# Patient Record
(demographics unavailable — no encounter records)

---

## 2017-02-13 NOTE — UC
Kirti DOBSON Erika, scribed for Melisa Solorzano MD on 17 at 1512 .





 Complaint Female HPI





- HPI Summary


HPI Summary: 


Patient is a 28-year-old female presenting to [facility] with a CC of vaginal 

bleeding and abdominal cramping. Patient reports that in , she had a Mirena 

IUD placed, and she has not had periods since then. She states that she can 

still feel the strings of her IUD in place. Three days ago, patient developed 

heavy vaginal bleeding with clots, as well as gradual onset of suprapubic and 

lower back cramping. She reports that pain is worse than regular menstrual 

cramps. Now, bleeding has become lighter, but is still present. Patient denies 

nausea, vomiting, dysuria, and vaginal discharge. She states she has had yeast 

infections in the past, but this does not feel similar. A0. Patient has a 

monogamous partner and denies prior Hx STI.





- History Of Current Complaint


Chief Complaint: UCGeneralIllness


Stated Complaint: VAG BLEEDING


Time Seen by Provider: 17 14:11


Hx Obtained From: Patient


Hx Last Menstrual Period: IUD in  - no periods


Pregnant?: No


Onset/Duration: Gradual Onset, Lasting Days, Still Present


Timing: Constant


Severity Currently: Moderate


Pain Intensity: 6


Pain Scale Used: 0-10 Numeric


Character: Cramping


Aggravating Factor(s): Nothing


Alleviating Factor(s): Nothing


Associated Signs And Symptoms: Positive: Back Pain - lower back, Vaginal 

Bleeding/Discharge - Bleeding.  Negative: Vaginal Discharge, Nausea


Related Hx:  - 2, Para - 2





- Allergies/Home Medications


Allergies/Adverse Reactions: 


 Allergies











Allergy/AdvReac Type Severity Reaction Status Date / Time


 


Erythromycin Allergy Severe Hives Verified 17 14:00














PMH/Surg Hx/FS Hx/Imm Hx


Endocrine History Of: 


   Denies: Diabetes, Thyroid Disease


Cardiovascular History Of: 


   Denies: Cardiac Disorders, Hypertension


Respiratory History Of: Reports: Asthma - albuterol MDI as needed (yearly maybe 

per pt)


   Denies: COPD


GI/ History Of: 


   Denies: Ulcer





- Surgical History


Surgical History: Yes


Surgery Procedure, Year, and Place: removal of ovary r/t cyst age 13.  c-

section x2





- Family History


Known Family History: Positive: Unknown, Cardiac Disease - pt states father has 

arrythmia, Diabetes


   Negative: Hypertension





- Social History


Lives: With Family


Alcohol Use: None


Substance Use Type: None


Smoking Status (MU): Current Every Day Smoker


Type: Cigarettes


Amount Used/How Often: 1/2 PPD


Have You Smoked in the Last Year: Yes





- Immunization History


Most Recent Influenza Vaccination: 





Review of Systems


Constitutional: Negative


Skin: Negative


Eyes: Negative


ENT: Negative


Respiratory: Negative


Cardiovascular: Negative


Gastrointestinal: Abdominal Pain - cramping


Genitourinary: Other - vaginal bleeding


Motor: Negative


Neurovascular: Negative


Musculoskeletal: Myalgia - lower back


Neurological: Negative


Psychological: Negative


All Other Systems Reviewed And Are Negative: Yes





Physical Exam


Triage Information Reviewed: Yes


Appearance: Well-Appearing, Well-Nourished, Pain Distress - Mild


Vital Signs: 


 Initial Vital Signs











Temp  99.1 F   17 13:56


 


Pulse  81   17 13:56


 


Resp  18   17 13:56


 


BP  129/66   17 13:56


 


Pulse Ox  99   17 13:56











Vital Signs Reviewed: Yes


Eyes: Positive: Conjunctiva Clear


ENT: Positive: Normal ENT inspection


Neck: Positive: Supple


Respiratory: Positive: No respiratory distress


Cardiovascular: Positive: RRR, Pulses Normal, Brisk Capillary Refill


Abdomen Description: Positive: Nontender, No Organomegaly, Soft, Other: -  

exam: Moderate amount of blood and clots in the vagina. Strings from the Mirena 

visible. Cervix long and closed and nontender. Uterus normal size and 

nontender. Adnexae no masses and nontender. Nurse Erendira assisted with pelvic 

exam..  Negative: CVA Tenderness (R), CVA Tenderness (L), Distended, Guarding


Bowel Sounds: Positive: Present


Musculoskeletal: Positive: Strength Intact, ROM Intact


Neurological: Positive: Alert, Muscle Tone Normal


Psychological Exam: Normal


Skin Exam: Normal





Diagnostics





- Laboratory


Diagnostic Studies Completed/Ordered: UA: Negative pregnancy, 1.010, pH 8, 30 mg

/dL protein, all else negative





 Complaint Female Dx





- Course


Course Of Treatment: UHCG neg.  UA ph 8, 30mg/dl protein, otherwise neg





- Differential Dx/Diagnosis


Differential Diagnosis/HQI/PQRI: Cervicitis, Endometriosis, Pelvic Inflammatory 

Disease, Pregnancy, Urinary Tract Infection


Provider Diagnoses: menorrhagia





Discharge





- Discharge Plan


Condition: Stable


Disposition: HOME


Prescriptions: 


Ferrous Sulfate [Iron (Ferrous Sulfate)] 50 mg PO BID #30 tab


Ibuprofen TAB* [Motrin TAB* 800 MG] 800 mg PO Q6H #30 tab


Patient Education Materials:  Dysfunctional Uterine Bleeding (ED)


Referrals: 


Vick Gunn MD [Primary Care Provider] - 





The documentation as recorded by the Kirti beltre Erika accurately reflects 

the service I personally performed and the decisions made by Rashad kruse Barbara J, MD.

## 2017-04-06 NOTE — UC
Complaint Female HPI





- HPI Summary


HPI Summary: 





The patient comes in today for:





1.   Vaginal pain:





Onset: Just today--she woke up with it.  


Palliative/provocative:  Nothing makes her pain better or worse. 


Quality: Burning.


Region: Vaginal area. 


Severity: 8/10


Time: Constant.


Associated symptoms:


   Previous disease: "I've had a lot of yeast infections."  Her last one was 

August of 2016


   Vaginal discharge:  None.


   Fevers: None.


   She had intercourse "a few days ago."








*





- History Of Current Complaint


Chief Complaint: UCGU


Stated Complaint: YEAST INFECTION


Time Seen by Provider: 04/06/17 08:23


Hx Last Menstrual Period: 3/1/17


Pregnant?: No





- Allergies/Home Medications


Allergies/Adverse Reactions: 


 Allergies











Allergy/AdvReac Type Severity Reaction Status Date / Time


 


Erythromycin Allergy Severe Hives Verified 04/06/17 07:42














PMH/Surg Hx/FS Hx/Imm Hx


Previously Healthy: No - Family planning/Mirena


Endocrine History Of: 


   Denies: Diabetes, Thyroid Disease


Cardiovascular History Of: 


   Denies: Cardiac Disorders, Hypertension, Pacemaker/ICD, Myocardial Infarction

, Congestive Heart Failure, Atrial Fibrillation, Deep Vein Thrombosis, Bleeding 

Disorders


Respiratory History Of: Reports: Asthma - She does not take any medications at 

this time for her asthma.


   Denies: COPD, Bronchitis, Pneumonia, Pulmonary Embolism


GI/ History Of: 


   Denies: Gastroesophageal Reflux, Ulcer, Gastrointestinal Bleed, Gall Bladder 

Disease, Kidney Stones, Diverticulitis, Renal Disease, Urosepsis


Neurological History Of: 


   Denies: TIA, CVA, Dementia, Seizures, Migraine


Psychological History Of: 


   Denies: Anxiety, Depression, Bipolar Disorder, Schizophrenia, Post Traumatic 

Stress Disorder


Cancer History Of: 


   Denies: Lung Cancer, Colorectal Cancer, Breast Cancer, Prostate Cancer, 

Cervical Cancer


Other History Of: 


   Negative For: HIV, Hepatitis B, Hepatitis C, Anticoagulant Therapy





- Surgical History


Surgical History: Yes


Surgery Procedure, Year, and Place: removal of ovary r/t cyst age 13.  c-

section x2





- Family History


Known Family History: Positive: Cardiac Disease - pt states father has arrythmia

, Diabetes


   Negative: Hypertension





- Social History


Occupation: Employed Full-time


Alcohol Use: None


Substance Use Type: None


Smoking Status (MU): Current Every Day Smoker


Type: Cigarettes


Amount Used/How Often: 1/2 PPD


Have You Smoked in the Last Year: Yes





- Immunization History


Most Recent Influenza Vaccination: 2015/2016





Review of Systems


Constitutional: Negative


Skin: Negative


Eyes: Negative


ENT: Negative


Respiratory: Negative


Cardiovascular: Negative


Gastrointestinal: Negative


Genitourinary: Dysuria, Frequency


All Other Systems Reviewed And Are Negative: Yes





Physical Exam


Triage Information Reviewed: Yes


Appearance: Well-Appearing, No Pain Distress, Well-Nourished


Vital Signs: 


 Initial Vital Signs











Temp  97.5 F   04/06/17 07:44


 


Pulse  56   04/06/17 07:44


 


Resp  14   04/06/17 07:44


 


BP  120/77   04/06/17 07:44


 


Pulse Ox  100   04/06/17 07:44











Vital Signs Reviewed: Yes


Eyes: Positive: Conjunctiva Clear.  Negative: Discharge


ENT: Positive: Hearing grossly normal.  Negative: Pharyngeal erythema, Nasal 

congestion, Nasal drainage, TM bulging, TM dull, TM red, Tonsillar swelling, 

Tonsillar exudate


Dental: Negative: Gross Decay/Caries @, Dental Fracture @


Neck: Positive: Supple, Nontender, No Lymphadenopathy.  Negative: Nuchal 

Rigidity


Respiratory: Positive: Chest non-tender, Lungs clear, No respiratory distress, 

No accessory muscle use.  Negative: Crackles, Wheezing


Cardiovascular: Positive: RRR, No Murmur


Abdomen Description: Positive: Nontender, No Organomegaly, Soft.  Negative: 

Distended, Guarding


Musculoskeletal: Positive: Strength Intact, ROM Intact


Neurological: Positive: Alert, Muscle Tone Normal


Psychological: Negative: Age Appropriate Behavior, Consolable


Skin: Negative: rashes, breakdown





UC Physical Exam


Vital Signs On Initial Exam: 


 Initial Vitals











Temp Pulse Resp BP Pulse Ox


 


 97.5 F   56   14   120/77   100 


 


 04/06/17 07:44  04/06/17 07:44  04/06/17 07:44  04/06/17 07:44  04/06/17 07:44














- Genitalia Exam


Female Genitourinary: Other - External:  No red papules.  No cluster of shallow 

ulcers on an erythematous base.  No edema.  The area that was most tender 

appeared slightly erythematous, (as expected from aggressive intercourse) at 

the vaginal opening.  But, no edema or ulcers.   Speculum:  There was a scant 

amount of whitish fluid.  There was no caking adhearant white material.  No 

foaming of the fluid.  There was no particular ordor.  Cervix: not friable or 

red.   Bimanual:  No cervical motion tenderness.  No masses or tenderness of 

the adnexa.





Diagnostics





- Laboratory


Diagnostic Studies Completed/Ordered: GC/Chlam.  Affirm.  HSV PCR





 Complaint Female Dx





- Course


Course Of Treatment: Patient was told of the findings--no classic appearance of 

HSV or yeast.  Patient was told of her treatment options.





- Differential Dx/Diagnosis


Differential Diagnosis/HQI/PQRI: Cervicitis


Provider Diagnoses: Vaginitis





Discharge





- Discharge Plan


Condition: Stable


Disposition: HOME


Patient Education Materials:  Vaginitis (ED)


Forms:  *Work Release


Referrals: 


Vick Gunn MD [Primary Care Provider] - 1 Week


(Please see your primary care provider in a week to see how well you are doing. 


If you get worse, please be seen sooner in the ER or through us.)

## 2017-04-10 NOTE — UC
Respiratory Complaint HPI





- HPI Summary


HPI Summary: 





Cough, upper chest pain with breathing/cough, and difficulty breathing since 

yesterday. Has asthma, is out of inhaler. Denies fever, nasal congestion, or 

ST. 





- History of Current Complaint


Chief Complaint: UCRespiratory


Stated Complaint: COUGH,CHEST HURTS


Time Seen by Provider: 04/10/17 20:50


Hx Obtained From: Patient


Hx Last Menstrual Period: IUD


Pregnant?: No


Onset/Duration: Gradual Onset, Lasting Days


Timing: Constant


Severity Initially: Mild


Severity Currently: Moderate


Character: Cough: Nonproductive


Aggravating Factors: Deep Breaths, Recumbent Position


Alleviating Factors: Upright Position


Associated Signs And Symptoms: Positive: Wheezing.  Negative: Fever, Chills, URI

, Nasal Congestion





- Allergies/Home Medications


Allergies/Adverse Reactions: 


 Allergies











Allergy/AdvReac Type Severity Reaction Status Date / Time


 


Erythromycin Allergy Severe Hives Verified 04/10/17 20:42














PMH/Surg Hx/FS Hx/Imm Hx


Endocrine History Of: 


   Denies: Diabetes, Thyroid Disease


Cardiovascular History Of: 


   Denies: Cardiac Disorders, Hypertension, Pacemaker/ICD, Myocardial Infarction

, Congestive Heart Failure, Atrial Fibrillation, Deep Vein Thrombosis, Bleeding 

Disorders


Respiratory History Of: Reports: Asthma


   Denies: COPD, Bronchitis, Pneumonia, Pulmonary Embolism


GI/ History Of: 


   Denies: Gastroesophageal Reflux, Ulcer, Gastrointestinal Bleed, Gall Bladder 

Disease, Kidney Stones, Diverticulitis, Renal Disease, Urosepsis


Neurological History Of: 


   Denies: TIA, CVA, Dementia, Seizures, Migraine


Psychological History Of: 


   Denies: Anxiety, Depression, Bipolar Disorder, Schizophrenia, Post Traumatic 

Stress Disorder


Cancer History Of: 


   Denies: Lung Cancer, Colorectal Cancer, Breast Cancer, Prostate Cancer, 

Cervical Cancer


Other History Of: 


   Negative For: HIV, Hepatitis B, Hepatitis C, Anticoagulant Therapy





- Surgical History


Surgical History: Yes


Surgery Procedure, Year, and Place: removal of ovary r/t cyst age 13.  c-

section x2





- Family History


Known Family History: Positive: Cardiac Disease - pt states father has arrythmia

, Diabetes


   Negative: Hypertension





- Social History


Occupation: Student - in CNA training


Alcohol Use: None


Substance Use Type: None


Smoking Status (MU): Current Every Day Smoker


Type: Cigarettes


Amount Used/How Often: 1/2 PPD


Have You Smoked in the Last Year: Yes





- Immunization History


Most Recent Influenza Vaccination: 03/2017





Review of Systems


Constitutional: Negative


Skin: Negative


Eyes: Negative


ENT: Negative


Respiratory: Shortness Of Breath, Cough


Cardiovascular: Negative


Gastrointestinal: Negative


Genitourinary: Negative


Motor: Negative


Neurovascular: Negative


Musculoskeletal: Negative


Neurological: Negative


Psychological: Negative


All Other Systems Reviewed And Are Negative: Yes





Physical Exam


Triage Information Reviewed: Yes


Appearance: Well-Appearing, Obese


Vital Signs: 


 Initial Vital Signs











Temp  98.5 F   04/10/17 20:38


 


Pulse  95   04/10/17 20:38


 


Resp  20   04/10/17 20:38


 


BP  129/71   04/10/17 20:38


 


Pulse Ox  100   04/10/17 20:38











Vital Signs Reviewed: Yes


Eye Exam: Normal


Eyes: Positive: Conjunctiva Clear


ENT Exam: Normal


ENT: Positive: Normal ENT inspection, Hearing grossly normal, Pharynx normal, 

TMs normal


Dental Exam: Normal


Neck exam: Normal


Neck: Positive: Supple, Nontender, No Lymphadenopathy


Respiratory: Positive: Lungs clear, Normal breath sounds, No respiratory 

distress


Cardiovascular Exam: Normal


Cardiovascular: Positive: RRR, No Murmur


Musculoskeletal Exam: Normal


Musculoskeletal: Positive: ROM Intact


Neurological Exam: Normal


Neurological: Positive: Alert


Psychological Exam: Normal


Skin Exam: Normal





UC Diagnostic Evaluation





- Laboratory


O2 Sat by Pulse Oximetry: 100





Respiratory Course/Dx





- Differential Dx/Diagnosis


Provider Diagnoses: bronchitis.  elevated blood pressure due to discomfort





Discharge





- Discharge Plan


Condition: Stable


Disposition: HOME


Patient Education Materials:  Acute Bronchitis (ED)


Referrals: 


Vick Gunn MD [Primary Care Provider] - 


Additional Instructions: 


Call or return if you develop increasing fever, shortness of breath, chest pain

, bloody sputum, or otherwise worsen.  If you have not improved at all after 

several days, contact your primary care physician or return here.

## 2017-04-12 NOTE — UC
Throat Pain/Nasal Sandoval HPI





- HPI Summary


HPI Summary: 





complaint of cough that started 2 days ago


seen here monday dx with bronchitis- given albuerol inhaler and sent home


yesterday started to feel worse- constant headache, nasal congestion, joints 

feel aching, fever and chills started this afternoon


 sore throat from coughing


denies N/V/D 


 using albuterol apporx 1-2 hours without relief for coughing





- History of Current Complaint


Chief Complaint: UCRespiratory


Stated Complaint: SORE THROAT HEAD CONGESTION BODYACHES


Time Seen by Provider: 04/12/17 16:43


Hx Obtained From: Patient


Hx Last Menstrual Period: 3/16/17





- Allergies/Home Medications


Allergies/Adverse Reactions: 


 Allergies











Allergy/AdvReac Type Severity Reaction Status Date / Time


 


Erythromycin Allergy Severe Hives Verified 04/12/17 15:04











Home Medications: 


 Home Medications





Albuterol HFA INHALER* [Ventolin HFA Inhaler*]  04/12/17 [History Confirmed 04/ 12/17]











PMH/Surg Hx/FS Hx/Imm Hx


Previously Healthy: Yes


Endocrine History Of: 


   Denies: Diabetes, Thyroid Disease


Cardiovascular History Of: 


   Denies: Cardiac Disorders, Hypertension, Pacemaker/ICD, Myocardial Infarction

, Congestive Heart Failure, Atrial Fibrillation, Deep Vein Thrombosis, Bleeding 

Disorders


Respiratory History Of: Reports: Asthma


   Denies: COPD, Bronchitis, Pneumonia, Pulmonary Embolism


GI/ History Of: 


   Denies: Gastroesophageal Reflux, Ulcer, Gastrointestinal Bleed, Gall Bladder 

Disease, Kidney Stones, Diverticulitis, Renal Disease, Urosepsis


Neurological History Of: 


   Denies: TIA, CVA, Dementia, Seizures, Migraine


Psychological History Of: 


   Denies: Anxiety, Depression, Bipolar Disorder, Schizophrenia, Post Traumatic 

Stress Disorder


Cancer History Of: 


   Denies: Lung Cancer, Colorectal Cancer, Breast Cancer, Prostate Cancer, 

Cervical Cancer


Other History Of: 


   Negative For: HIV, Hepatitis B, Hepatitis C, Anticoagulant Therapy





- Surgical History


Surgical History: Yes


Surgery Procedure, Year, and Place: removal of ovary r/t cyst age 13.  c-

section x2





- Family History


Known Family History: Positive: Cardiac Disease - pt states father has arrythmia

, Diabetes


   Negative: Hypertension





- Social History


Occupation: Employed Full-time


Lives: With Family


Alcohol Use: None


Substance Use Type: None


Smoking Status (MU): Current Every Day Smoker


Type: Cigarettes


Amount Used/How Often: 1/2 PPD


Have You Smoked in the Last Year: Yes


Cessation Counseling: Patient Advised to Stop





- Immunization History


Most Recent Influenza Vaccination: 03/2017





Review of Systems


Constitutional: Fever, Chills, Fatigue


Skin: Negative


Eyes: Negative


ENT: Sore Throat, Nasal Discharge


Respiratory: Cough


Cardiovascular: Negative


Gastrointestinal: Negative


Genitourinary: Negative


Motor: Negative


Neurovascular: Negative


Musculoskeletal: Negative


Neurological: Negative


Psychological: Negative


All Other Systems Reviewed And Are Negative: Yes





Physical Exam


Triage Information Reviewed: Yes


Appearance: No Pain Distress, Well-Nourished, Ill-Appearing


Vital Signs: 


 Initial Vital Signs











Temp  98.8 F   04/12/17 15:00


 


Pulse  97   04/12/17 15:00


 


Resp  18   04/12/17 15:00


 


BP  134/86   04/12/17 15:00


 


Pulse Ox  98   04/12/17 15:00











Vital Signs Reviewed: Yes


Eyes: Positive: Conjunctiva Clear


ENT: Positive: Pharyngeal erythema, Nasal congestion, Nasal drainage, TMs 

normal.  Negative: Tonsillar swelling, Tonsillar exudate


Neck: Positive: No Lymphadenopathy


Respiratory: Positive: Rhonchi - in BLL, Wheezing - throughout


Cardiovascular: Positive: RRR, No Murmur, Pulses Normal


Abdomen Description: Positive: Nontender, Soft


Bowel Sounds: Positive: Present


Musculoskeletal: Positive: No Edema


Neurological Exam: Normal


Psychological Exam: Normal


Skin Exam: Normal





Re-Evaluation





- Re-Evaluation


  ** First Eval


Change: Improved - less wheezing throughout





Throat Pain/Nasal Course/Dx





- Course


Course Of Treatment: exam completed.  wheezing and rhonchi in lower lung firelds

, improvement with duoneb.  will rx for prednisone d/t asthma exacerbation 

secondary to influenza.  followup with PCP





- Differential Dx/Diagnosis


Differential Diagnosis/HQI/PQRI: Influenza, URI, Other - bronchitis, asthma 

exacerbation


Provider Diagnoses: asthma exacerbation





Discharge





- Discharge Plan


Condition: Stable


Disposition: HOME


Prescriptions: 


predniSONE TAB* [Deltasone TAB*] 50 mg PO DAILY #5 tab


Patient Education Materials:  Asthma (ED), Influenza (ED)


Forms:  *Work Release


Referrals: 


Vick Gunn MD [Primary Care Provider] - 


Additional Instructions: 


Use your albuterol inhaler every 4-6 hours when needed for wheezing, shortness 

of breath or uncontrolled coughing.


take prednisone as directed


 Increase fluids and rest


Take acetaminophen or ibuprofen for fever or pain


Please review your discharge instructions. 


If your symptoms do not improve please call your primary care provider or 

return to urgent care.








 TREATING THE FLU 


(Influenza) 


What is the Flu? 


Influenza, or "flu," is an infection of the breathing tubes and lungs. Flu 

happens mostly in late fall, winter, or early spring. It is very easily spread 

from one person to another by coughing and sneezing. The flu affects people of 

all ages. 


Symptoms Might Include: 


 Stuffed up or runny nose 


 Cough  which may be worse at night  that lasts for one to two weeks 


 Fever  especially the first 2 days  and which may go up and down 


 Headache and muscle aches 


 Mild sore throat 


 Poor appetite 


 Tiredness 





Influenza (Flu) Vaccine 


Much of the illness and death caused by influenza can be prevented by annual 

flu vaccination. It is especially recommended for people who are at high risk. 

Those at higher risk include all people aged 65 years or older and people of 

any age with chronic diseases of the heart, lung or kidneys, diabetes, 

immunosuppression, or severe forms of anemia. Other high-risk groups are, women 

who will be more than 3 months pregnant during the flu season, and children. 


Treatment Recommendations: 


 Take acetaminophen (Tylenol, etc.) for aches and fever. Do not ever give 

aspirin to children. 


 Drink lots of fluids. 


 Use a cool-mist humidifier night and day if it helps you. 


 Keep room at a comfortable temperature for you. Do not overheat the room. Do 

not overdress. 


 Do not smoke. 


 Get as much rest as you can. 





Call Your Doctor or Return Here IF: 


 You have a fever that lasts for more than three days. 


 You have trouble breathing. 


 You begin to cough up green, yellow, or red mucous. 


 Your cough gets worse or you have chest pain with coughing or deep breathing. 


 You start to have any other symptoms that worry you.

## 2017-06-12 NOTE — ED
I, Oh,Celestina, scribed for Krishan Isaac MD on 06/12/17 at 0557 .





Abdominal Pain/Female





- HPI Summary


HPI Summary: 





This 28 y/o female presents to ED for acute onset of abd pain since she woke up 

this morning. Abd pain is described as sharp, constant, and located on left 

side. No radiation to back or groin. Positive vaginal spotting and nausea. 

Negative dysuria, hematuria, fever, chills, or vomiting. LMP was 2 weeks ago. 

Pt has not eaten since she woke up. Walking does not change the pain. Pt is 

current smoker and nondriner. 





Plan of care involving CT scan is discussed with pt, and she is agreeable. 





- History of Current Complaint


Chief Complaint: EDAbdPain


Stated Complaint: ABD PAIN


Time Seen by Provider: 06/12/17 05:44


Hx Obtained From: Patient, Medical Records


Hx Last Menstrual Period: 3/16/17


Pain Intensity: 8


Allergies/Adverse Reactions: 


 Allergies











Allergy/AdvReac Type Severity Reaction Status Date / Time


 


Erythromycin Allergy Severe Hives Verified 04/12/17 15:04














PMH/Surg Hx/FS Hx/Imm Hx


Endocrine/Hematology History: 


   Denies: Hx Anticoagulant Therapy, Hx Diabetes, Hx Thyroid Disease


Cardiovascular History: 


   Denies: Hx Congestive Heart Failure, Hx Deep Vein Thrombosis, Hx Hypertension

, Hx Myocardial Infarction, Hx Pacemaker/ICD


Respiratory History: Reports: Hx Asthma


   Denies: Hx Chronic Obstructive Pulmonary Disease (COPD), Hx Lung Cancer, Hx 

Pneumonia, Hx Pulmonary Embolism


GI History: 


   Denies: Hx Gall Bladder Disease, Hx Gastrointestinal Bleed, Hx Ulcer, Hx 

Urosepsis


 History: 


   Denies: Hx Kidney Stones, Hx Renal Disease


Neurological History: 


   Denies: Hx Dementia, Hx Migraine, Hx Seizures, Hx Transient Ischemic Attacks 

(TIA)


Psychiatric History: 


   Denies: Hx Anxiety, Hx Depression, Hx Schizophrenia, Hx Bipolar Disorder





- Surgical History


Surgery Procedure, Year, and Place: removal of ovary r/t cyst age 13.  c-

section x2


Infectious Disease History: 


   Denies: Hx Clostridium Difficile, Hx Hepatitis, Hx Human Immunodeficiency 

Virus (HIV), Hx of Known/Suspected MRSA, Hx Shingles, Hx Tuberculosis, Hx Known/

Suspected VRE, Hx Known/Suspected VRSA, History Other Infectious Disease, 

Traveled Outside the [place] in Last 30 Days





- Family History


Known Family History: Positive: Cardiac Disease - pt states father has arrythmia

, Diabetes


   Negative: Hypertension, Other - negative diverticulitis. 





- Social History


Alcohol Use: None


Substance Use Type: Reports: None


Smoking Status (MU): Current Every Day Smoker


Type: Cigarettes


Amount Used/How Often: 1/2 PPD


Have You Smoked in the Last Year: Yes





Review of Systems


Negative: Fever


Positive: Abdominal Pain, Nausea.  Negative: Vomiting


Positive: other - vaginal spotting


All Other Systems Reviewed And Are Negative: Yes





Physical Exam





- Summary


Physical Exam Summary: 








The patient is well-nourished in no acute distress and in no acute pain.





The skin is warm and diaphoretic. 





HEENT:  The head is normocephalic and atraumatic. The pupils are equal and 

reactive. The conjunctivae are clear and without drainage.  Nares are patent 

and without drainage.  Mouth reveals moist mucous membranes and the throat is 

without erythema and exudate.  The external ears are intact. The ear canals are 

patent and without drainage. The tympanic membranes are intact.





Neck is supple with full range of motion and non-tender. There are no carotid 

bruits.  There is no neck vein distension.





Respiratory: Chest is non-tender.  Lungs are clear to auscultation and breath 

sounds are symmetrical and equal.





Cardiovascular: Hear is regular rate and rhythm.  There is no murmur or rub 

auscultated.   There is no peripheral edema and pulses are symmetrical and 

equal.





Abdomen: The abdomen is soft and tender at LUQ, left mid quadrant, and LLQ. 

Positive percussive tenderness. There are normal bowel sounds heard in all four 

quadrants and there is no organomegaly palpated. Negative CVA tenderness.





Musculoskeletal: There is no back pain noted.  Extremities are non-tender with 

full range of motion.  There is good capillary refill.  There is no peripheral 

edema or calf tenderness elicited.





Neurological: Patient is alert and oriented to person, place and time.  The 

patient has symmetrical motor strength in all four extremities.  Cranial nerves 

are grossly intact. Deep tendon reflexes are symmetrical and equal in all four 

extremities.





Psychiatric: The patient has an appropriate affect and does not exhibit any 

anxiety or depression. 


Triage Information Reviewed: Yes


Vital Signs On Initial Exam: 


 Initial Vitals











Temp Pulse Resp BP Pulse Ox


 


 98 F   70   18   128/76   100 


 


 06/12/17 05:38  06/12/17 05:38  06/12/17 05:38  06/12/17 05:38  06/12/17 05:38











Vital Signs Reviewed: Yes





Diagnostics





- Vital Signs


 Vital Signs











  Temp Pulse Resp BP Pulse Ox


 


 06/12/17 05:38  98 F  70  18  128/76  100














- Laboratory


Lab Statement: Any lab studies that have been ordered have been reviewed, and 

results considered in the medical decision making process.





- CT


  ** Ab/P


CT Interpretation Completed By: Radiologist - See EMR**





Abdominal Pain Fem Course/Dx





- Diagnoses


Differential Diagnosis: Positive: Bowel Obstruction, Constipation, 

Diverticulitis, Pancreatitis, Renal Colic


Provider Diagnoses: 


 Abdominal pain








Discharge





- Discharge Plan


Condition: Stable


Disposition: OTHER


Discharge Disposition Comment: Pending bloodwork and CT Imaging. 


Referrals: 


Vick Gunn MD [Primary Care Provider] - 





The documentation as recorded by the Leroy beltre Soohyun accurately reflects the 

service I personally performed and the decisions made by me, Krishan Isaac MD.

## 2017-06-12 NOTE — ED
Galilea DOBSON Alfonso, tyreeibed for Monty Ramos MD on 06/12/17 at 0720 .





Progress





- Progress Note


Progress Note: 


This patient was signed out by Dr. Isaac. 








- Results/Orders


Results/Orders: 


CT A/P -1. Hepatomegaly and hepatosteatosis. 2. Normal appendix documented. No 

obstructive or inflammatory process of the alimentary tract evident. 3. 

Negative for urolithiasis or hydronephrosis.





Transvaginal US - Normal and age-appropriate pelvic ultrasound status post 

right oopherectomy as well as appropriately positioned intrauterine device.





Re-Evaluation





- Re-Evaluation


  ** 0816


Comment: Discussed lab results with the patient. She reports no pain at this 

time.





  ** Second Eval


Re-Evaluation Time: 08:50





Course/Dx





- Course


Course Of Treatment: PAIN DECREASED IN ED. DICUSSED RESULTS WITH PATIENT.  F/U 

WITH PMD, RETURN IF WORSE.  DISCHARGE HOME STABLE.  NO CRITICAL CARE TIME.





- Diagnoses


Provider Diagnoses: 


 Abdominal pain, Hematuria








The documentation as recorded by the Galilea beltre Alfonso accurately reflects 

the service I personally performed and the decisions made by me, Monty Ramos MD.

## 2017-06-12 NOTE — RAD
INDICATION: Abdominal pain. Question colitis and nephrolithiasis. Previous RIGHT ovarian

cyst resection. Previous .



COMPARISON: July 10, 2010 CT.



TECHNIQUE: Multidetector CT images were obtained from the lung bases to the ischial

tuberosities with 100 mL Omnipaque 300 IV and oral contrast.  Multiplanar reformation.



REPORT: Minimal bibasilar dependent atelectasis.



20 cm cephalocaudal liver. Mildly decreased density of the liver consistent with

hepatosteatosis. No focal hepatic lesions or biliary dilatation. Unremarkable CT

appearance of the gallbladder, pancreas, spleen. Splenule at the splenic hilum.



Negative for CT abnormality in the upper GI, small bowel, retrocecal appendix, colon.

Negative for ascites, free air, hernias.



Normal adrenal glands. Symmetric nephrograms and pyelograms. Unremarkable kidneys,

ureters, and largely decompressed urinary bladder. Retroverted uterus with IUD in place.

Unremarkable adnexal regions.



Negative for lymphadenopathy. Unremarkable dominant retroperitoneal vasculature.



Mild osteoarthritis of the bilateral hips. No suspicious focal osseous lesions.



IMPRESSION: 

1. Hepatomegaly and hepatosteatosis.

2. Normal appendix documented. No obstructive or inflammatory process of the alimentary

tract evident.

3. Negative for urolithiasis or hydronephrosis.

## 2017-06-12 NOTE — RAD
INDICATION: Left lower quadrant pain. Relevant surgical history includes right sided

oopherectomy. 



COMPARISON: No prior nongravid pelvic ultrasound. The patient has a CT abdomen pelvis from

the same day.

 

TECHNIQUE: Real-time transabdominal and transvaginal ultrasound examination of the female

pelvis including grayscale and Doppler color flow imaging.



FINDINGS: 



Uterus: The retroverted uterus is normal in size and echogenicity measuring 7.1 x 4.2 x

5.3 cm. The endometrial stripe is smooth and uniform measuring 4 mm in thickness. The

patient's intrauterine device appears to be appropriately positioned at the fundal height

endometrium.



Ovaries: The left ovary measures 4.8 x 2.9 x 1.8 cm. Normal arterial and venous waveforms

are identified. Appearance is within normal limits for the patient's age. 



There is no free fluid in the cul-de-sac.



IMPRESSION: Normal and age-appropriate pelvic ultrasound status post right oopherectomy as

well as appropriately positioned intrauterine device.

## 2017-10-08 NOTE — UC
Abdominal Pain Female HPI





- HPI Summary


HPI Summary: 





PATIENT SEEN BY CRYSTAL ENRIQUEZ





- History of Current Complaint


Chief Complaint: UCGI


Stated Complaint: NAUSEA


Time Seen by Provider: 10/08/17 14:01


Hx Last Menstrual Period: 3 months ago, mirconstantin


Allergies/Adverse Reactions: 


 Allergies











Allergy/AdvReac Type Severity Reaction Status Date / Time


 


Erythromycin Allergy Severe Hives Verified 04/12/17 15:04














PMH/Surg Hx/FS Hx/Imm Hx


Other History Of: 


   Negative For: HIV, Hepatitis B, Hepatitis C, Anticoagulant Therapy





- Surgical History


Surgical History: Yes


Surgery Procedure, Year, and Place: removal of ovary r/t cyst age 13.  c-

section x2





- Family History


Known Family History: Positive: Cardiac Disease - pt states father has arrythmia

, Diabetes


   Negative: Hypertension, Other - negative diverticulitis. 





- Social History


Alcohol Use: None


Substance Use Type: None


Smoking Status (MU): Light Every Day Tobacco Smoker


Type: Cigarettes


Amount Used/How Often: 1/2 PPD


Have You Smoked in the Last Year: Yes





- Immunization History


Most Recent Influenza Vaccination: 03/2017





Review of Systems


Constitutional: Negative


Skin: Negative


Eyes: Negative


ENT: Negative


Respiratory: Negative


Cardiovascular: Negative


Gastrointestinal: Negative


Genitourinary: Negative


Motor: Negative


Neurovascular: Negative


Musculoskeletal: Negative


Neurological: Negative


Psychological: Negative


All Other Systems Reviewed And Are Negative: Yes





Physical Exam


Triage Information Reviewed: Yes


Vital Signs: 


 Initial Vital Signs











Temp  36.8 C   10/08/17 13:57


 


Pulse  77   10/08/17 13:57


 


Resp  18   10/08/17 13:57


 


BP  113/61   10/08/17 13:57


 


Pulse Ox  100   10/08/17 13:57











Eye Exam: Normal


ENT Exam: Normal


Dental Exam: Normal


Neck exam: Normal


Neck: Positive: 1


Respiratory Exam: Normal


Cardiovascular Exam: Normal


Abdominal Exam: Normal


Musculoskeletal Exam: Normal


Neurological Exam: Normal


Psychological Exam: Normal


Skin Exam: Normal





Abd Pain Female Course/Dx





- Differential Dx/Diagnosis


Provider Diagnoses: PATIENT SEEN BY CRYSTAL ENRIQUEZ





Discharge





- Discharge Plan


Condition: Stable


Disposition: HOME


Prescriptions: 


Ondansetron TAB* [Zofran 4 MG Tab*] 4 mg PO Q6H PRN #14 tab


 PRN Reason: Nausea


Patient Education Materials:  Acute Nausea and Vomiting (ED), Acute Diarrhea (ED

)


Forms:  *Work Release


Referrals: 


Vick Gunn MD [Primary Care Provider] -

## 2017-10-08 NOTE — UC
Abdominal Pain Female HPI





- HPI Summary


HPI Summary: 





Patient presents with complaints of nausea and diarrhea x 1 day. She states she 

feel so nauseous that she has been dry heaving all day. She denies pregnancy 

states she has the cherry IUD. She denies any abnormal vaginal discharge, 

bleeding, or dysuria. Denies any recent travel, ill contacts, or antibiotic 

therapy. 





- History of Current Complaint


Chief Complaint: UCGI


Stated Complaint: NAUSEA


Time Seen by Provider: 10/08/17 14:01


Hx Obtained From: Patient


Hx Last Menstrual Period: 3 months ago, mirana


Pregnant?: No


Onset/Duration: Sudden Onset, Lasting Days


Timing: Intermittent Episodes Lasting: - hours


Severity Initially: Mild


Severity Currently: Mild


Aggravating Factor(s): Food


Alleviating Factor(s): Spontaneous Resolution


Associated Signs and Symptoms: Positive: Decreased Appetite, Nausea, Diarrhea





- Risk Factors


Ectopic Pregnancy Risk Factor: Negative


Allergies/Adverse Reactions: 


 Allergies











Allergy/AdvReac Type Severity Reaction Status Date / Time


 


Erythromycin Allergy Severe Hives Verified 04/12/17 15:04














PMH/Surg Hx/FS Hx/Imm Hx


Previously Healthy: Yes


Other History Of: 


   Negative For: HIV, Hepatitis B, Hepatitis C, Anticoagulant Therapy





- Surgical History


Surgical History: Yes


Surgery Procedure, Year, and Place: removal of ovary r/t cyst age 13.  c-

section x2





- Family History


Known Family History: Positive: Cardiac Disease - pt states father has arrythmia

, Diabetes


   Negative: Hypertension, Other - negative diverticulitis. 





- Social History


Occupation: Employed Full-time


Lives: With Family


Alcohol Use: None


Substance Use Type: None


Smoking Status (MU): Light Every Day Tobacco Smoker


Type: Cigarettes


Amount Used/How Often: 1/2 PPD


Have You Smoked in the Last Year: Yes





- Immunization History


Most Recent Influenza Vaccination: 03/2017





Review of Systems


Constitutional: Negative


Skin: Negative


Eyes: Negative


ENT: Negative


Respiratory: Negative


Cardiovascular: Negative


Gastrointestinal: Diarrhea, Nausea


Genitourinary: Negative


Motor: Negative


Neurovascular: Negative


Musculoskeletal: Negative


Neurological: Negative


Psychological: Negative


All Other Systems Reviewed And Are Negative: Yes





Physical Exam


Triage Information Reviewed: Yes


Appearance: Well-Appearing


Vital Signs: 


 Initial Vital Signs











Temp  98.3 F   10/08/17 13:57


 


Pulse  77   10/08/17 13:57


 


Resp  18   10/08/17 13:57


 


BP  113/61   10/08/17 13:57


 


Pulse Ox  100   10/08/17 13:57











Vital Signs Reviewed: Yes


Eye Exam: Normal


ENT Exam: Normal


Neck exam: Normal


Respiratory Exam: Normal


Cardiovascular Exam: Normal


Abdominal Exam: Normal


Musculoskeletal Exam: Normal


Skin Exam: Normal





Abd Pain Female Course/Dx





- Course


Course Of Treatment: Patient was treated conservativity given she has no 

abdominal pain, she was given zofran in the department and had good responce. 

She denies any concerns regarding pregnancy she used the Cherry IUD. She was 

also provided with a work note for today. Discharged home with stable VS. 

Instructed to follow up if symptoms worsen or persist.





- Differential Dx/Diagnosis


Differential Diagnosis: Other - nausea diarrhea


Provider Diagnoses: nausea.  diarrhea





Discharge





- Discharge Plan


Condition: Stable


Disposition: HOME


Prescriptions: 


Ondansetron TAB* [Zofran 4 MG Tab*] 4 mg PO Q6H PRN #14 tab


 PRN Reason: Nausea


Patient Education Materials:  Acute Nausea and Vomiting (ED), Acute Diarrhea (ED

)


Forms:  *Work Release


Referrals: 


Vick Gunn MD [Primary Care Provider] -

## 2018-02-05 NOTE — UC
Respiratory Complaint HPI





- HPI Summary


HPI Summary: 





28 yo female with cough/runny nose/fever and chills x 2 days


ST


no CP or sob





no n/v/d











- History of Current Complaint


Chief Complaint: UCRespiratory


Stated Complaint: URI


Time Seen by Provider: 02/05/18 09:03


Hx Obtained From: Patient


Hx Last Menstrual Period: iud


Onset/Duration: Sudden Onset, Lasting Days


Severity Initially: Moderate


Severity Currently: Moderate


Pain Intensity: 6


Pain Scale Used: 0-10 Numeric


Character: Cough: Nonproductive


Aggravating Factors: Nothing


Alleviating Factors: Nothing


Associated Signs And Symptoms: Positive: Fever, Chills, Nasal Congestion, Sinus 

Discomfort





- Allergies/Home Medications


Allergies/Adverse Reactions: 


 Allergies











Allergy/AdvReac Type Severity Reaction Status Date / Time


 


erythromycin base Allergy  Hives Verified 02/05/18 08:42











Home Medications: 


 Home Medications





Claritin 10 MG CAP 10 mg PO DAILY 02/05/18 [History Confirmed 02/05/18]











PMH/Surg Hx/FS Hx/Imm Hx


Previously Healthy: Yes


Respiratory History: Asthma, Bronchitis, Pneumonia


Other History Of: 


   Negative For: HIV, Hepatitis B, Hepatitis C, Anticoagulant Therapy





- Surgical History


Surgical History: Yes


Surgery Procedure, Year, and Place: removal of ovary r/t cyst age 13.  c-

section x2





- Family History


Known Family History: Positive: Cardiac Disease - pt states father has arrythmia

, Diabetes


   Negative: Hypertension, Other - negative diverticulitis. 





- Social History


Alcohol Use: None


Substance Use Type: None


Smoking Status (MU): Light Every Day Tobacco Smoker


Type: Cigarettes


Amount Used/How Often: 1/2 PPD


Have You Smoked in the Last Year: Yes





- Immunization History


Most Recent Influenza Vaccination: 03/2017





Review of Systems


Constitutional: Fever, Chills, Fatigue


Skin: Negative


Eyes: Negative


ENT: Sore Throat, Ear Ache, Nasal Discharge, Sinus Congestion, Sinus Pain/

Tenderness


Respiratory: Cough


Cardiovascular: Negative


Gastrointestinal: Negative


Genitourinary: Negative


Motor: Negative


Neurovascular: Negative


Musculoskeletal: Myalgia


Neurological: Headache


Psychological: Negative


Is Patient Immunocompromised?: No


All Other Systems Reviewed And Are Negative: Yes





Physical Exam


Triage Information Reviewed: Yes


Appearance: Well-Appearing, No Pain Distress, Well-Nourished


Vital Signs: 


 Initial Vital Signs











Temp  99.3 F   02/05/18 08:44


 


Pulse  66   02/05/18 08:44


 


Resp  16   02/05/18 08:44


 


BP  112/67   02/05/18 08:44


 


Pulse Ox  100   02/05/18 08:44











Vital Signs Reviewed: Yes


Eyes: Positive: Conjunctiva Clear


ENT: Positive: Hearing grossly normal, Nasal congestion, Nasal drainage, 

Tonsillar swelling, Uvula midline.  Negative: Tonsillar exudate, Trismus, 

Muffled voice, Hoarse voice


Neck: Positive: Supple, Nontender


Respiratory Exam: Normal


Respiratory: Positive: Lungs clear, Normal breath sounds, No respiratory 

distress, No accessory muscle use


Cardiovascular: Positive: RRR, No Murmur


Musculoskeletal: Positive: ROM Intact, No Edema


Neurological: Positive: Alert


Psychological Exam: Normal


Skin Exam: Normal





UC Diagnostic Evaluation





- Laboratory


Pertinent Lab Values Are: WNL - (-) strep, (-) flu


O2 Sat by Pulse Oximetry: 100 - normal;/not hypoxic





Respiratory Course/Dx





- Differential Dx/Diagnosis


Provider Diagnoses: influenza or flu like illnes





Discharge





- Discharge Plan


Condition: Stable


Disposition: HOME


Prescriptions: 


Oseltamivir CAP* [Tamiflu CAP*] 75 mg PO BID #10 cap


Patient Education Materials:  Influenza (ED)


Forms:  *Work Release


Referrals: 


Vick Gunn MD [Primary Care Provider] - 4 Days (if not better)


Additional Instructions: 


despite the negative flu test I think we should start TAMIFLU as I suspect the 

flu or a flu-like illness








recheck for worsening symptoms





rest


fluids


tylenol or advil

## 2018-02-05 NOTE — UC
Head Injury HPI





- History Of Current Complaint


Chief Complaint: UCRespiratory


Stated Complaint: URI


Time Seen by Provider: 02/05/18 09:03


Hx Last Menstrual Period: iud


Pain Intensity: 6





- Allergies/Home Medications


Allergies/Adverse Reactions: 


 Allergies











Allergy/AdvReac Type Severity Reaction Status Date / Time


 


erythromycin base Allergy  Hives Verified 02/05/18 08:42











Home Medications: 


 Home Medications





Claritin 10 MG CAP 10 mg PO DAILY 02/05/18 [History Confirmed 02/05/18]











PMH/Surg Hx/FS Hx/Imm Hx


Other History Of: 


   Negative For: HIV, Hepatitis B, Hepatitis C, Anticoagulant Therapy





- Surgical History


Surgical History: Yes


Surgery Procedure, Year, and Place: removal of ovary r/t cyst age 13.  c-

section x2





- Family History


Known Family History: Positive: Cardiac Disease - pt states father has arrythmia

, Diabetes


   Negative: Hypertension, Other - negative diverticulitis. 





- Social History


Alcohol Use: None


Substance Use Type: None


Smoking Status (MU): Light Every Day Tobacco Smoker


Type: Cigarettes


Amount Used/How Often: 1/2 PPD


Have You Smoked in the Last Year: Yes





- Immunization History


Most Recent Influenza Vaccination: 03/2017





Physical Exam


Vital Signs: 


 Initial Vital Signs











Temp  99.3 F   02/05/18 08:44


 


Pulse  66   02/05/18 08:44


 


Resp  16   02/05/18 08:44


 


BP  112/67   02/05/18 08:44


 


Pulse Ox  100   02/05/18 08:44














Discharge





- Discharge Plan


Referrals: 


Vick Gunn MD [Primary Care Provider] -

## 2018-05-07 NOTE — UC
Throat Pain/Nasal Sandoval HPI





- HPI Summary


HPI Summary: 





Pt presents with sore throat and right ear pain for the last 2 days. Her 

daughter is sick with similar symptoms. She has not taken anything OTC. Denies 

fever, chills, cough, SOB, chest pain. She is still smoking daily





- History of Current Complaint


Chief Complaint: UCGeneralIllness


Stated Complaint: SORE THROAT


Time Seen by Provider: 05/07/18 09:15


Hx Obtained From: Patient


Hx Last Menstrual Period: IUD


Onset/Duration: Sudden Onset


Severity: Mild


Pain Intensity: 4


Pain Scale Used: 0-10 Numeric





- Allergies/Home Medications


Allergies/Adverse Reactions: 


 Allergies











Allergy/AdvReac Type Severity Reaction Status Date / Time


 


erythromycin base Allergy  Hives Verified 05/07/18 09:04











Home Medications: 


 Home Medications





Montelukast Sodium TAB* [Singulair 10 MG TAB*] 1 tab PO DAILY 05/07/18 [History 

Confirmed 05/07/18]











PMH/Surg Hx/FS Hx/Imm Hx


Previously Healthy: Yes


Respiratory History: COPD


Other History Of: 


   Negative For: HIV, Hepatitis B, Hepatitis C, Anticoagulant Therapy





- Surgical History


Surgical History: Yes


Surgery Procedure, Year, and Place: removal of ovary r/t cyst age 13.  c-

section x2





- Family History


Known Family History: Positive: Cardiac Disease - pt states father has arrythmia

, Diabetes


   Negative: Hypertension, Other - negative diverticulitis. 





- Social History


Occupation: Employed Full-time


Lives: With Family


Alcohol Use: None


Substance Use Type: None


Smoking Status (MU): Heavy Every Day Tobacco Smoker


Type: Cigarettes


Amount Used/How Often: 1/2 PPD


Have You Smoked in the Last Year: Yes





- Immunization History


Most Recent Influenza Vaccination: 03/2017





Review of Systems


Constitutional: Negative


Skin: Negative


Eyes: Negative


ENT: Sore Throat, Ear Ache


Respiratory: Negative


Cardiovascular: Negative


Gastrointestinal: Negative


Neurovascular: Negative


Neurological: Negative


Psychological: Negative


All Other Systems Reviewed And Are Negative: Yes





Physical Exam





- Summary


Physical Exam Summary: 





GENERAL: NAD. WDWN. No pain distress.


SKIN: No rashes, sores, lesions, or open wounds.


HEENT:


            Head: AT/NC


            Eyes: Conjunctiva clear without inflammation or discharge.


            Ears: Hearing grossly normal. TMs intact, no bulging, erythema, or 

edema. 


            Nose: Nasal mucosa pink and moist. NTTP maxillary and frontal 

sinus. 


            Throat: Posterior oropharynx mild erythema and 2+ tonsillar 

enlargement. No exudates. Uvula midline. No hoarse voice or muffled voice.


NECK: Supple. Nontender. No lymphadenopathy. 


CHEST:  CTAB. No r/r/w. No accessory muscle use. Breathing comfortably and in 

no distress.


CV:  RRR. Without m/r/g. Pulses intact. Brisk cap refill.


NEURO: Alert. CN II-XII grossly intact.


PSYCH: Age appropriate behavior.


Triage Information Reviewed: Yes


Vital Signs: 


 Initial Vital Signs











Temp  98.0 F   05/07/18 09:05


 


Pulse  80   05/07/18 09:05


 


Resp  16   05/07/18 09:05


 


BP  122/77   05/07/18 09:05


 


Pulse Ox  99   05/07/18 09:05














Throat Pain/Nasal Course/Dx





- Course


Course Of Treatment: POC strep negative.





- Differential Dx/Diagnosis


Provider Diagnoses: Pharyngitis





Discharge





- Sign-Out/Discharge


Documenting (check all that apply): Discharge/Admit/Transfer





- Discharge Plan


Condition: Stable


Disposition: HOME


Prescriptions: 


Amoxicillin PO (*) [Amoxicillin 500 MG CAP*] 500 mg PO Q12H #14 cap


Patient Education Materials:  Tonsillitis (ED)


Forms:  *Work Release


Referrals: 


Vick Gunn MD [Primary Care Provider] - 


Additional Instructions: 


If you develop a fever, shortness of breath, chest pain, new or worsening 

symptoms - please call your PCP or go to the ED.


 








- Billing Disposition and Condition


Condition: STABLE


Disposition: HOME

## 2018-05-23 NOTE — UC
Vernon DOBSON Rebecca, scribed for Monty Ramos MD on 05/23/18 at 1718 .





Headache HPI





- HPI Summary


HPI Summary: 


Pt is a 31 y/o F who presents to [facility] c/o REYES. Pain has been present since 

Monday (2 days) and is described as her typical migraine. Pain is currently 

moderate, ranked 8/10, though it has been waxing and waning from 8/10 to "way 

off the scale" since onset. HA is lcoated in the bilateral temporal regions. 

Has not taken anything for the pain, as her migraines are typically resolved by 

sleep, though this one is not. Sx aggravated and alleviated by nothing. 

Additionally notes slight cough for 1 week. Denies UE and LE weakness and 

nausea. Notes getting stung by a bee on Monday and the HA began after that. Has 

previously received Toradol and Morphine for her migraines. 








- History Of Current Complaint


Chief Complaint: UCHeadache


Stated Complaint: HEADACHES


Time Seen by Provider: 05/23/18 17:11


Hx Obtained From: Patient


Hx Last Menstrual Period: IUD


Onset/Duration: Lasting Days - 2 days, Still Present


Currently Pain Is: Current Pain Scale(0-10)= - 8/10, Moderate


Pain Intensity: 8


Pain Scale Used: 0-10 Numeric


Location of Headache: Temporal - Bilateral


Aggravating Factor(s): Nothing


Allevating Factor(s): Nothing


Associated Signs And Symptoms: Positive: Other (Noted In Comments) - Cough.  

Negative: Nausea





- Allergies/Home Medications


Allergies/Adverse Reactions: 


 Allergies











Allergy/AdvReac Type Severity Reaction Status Date / Time


 


erythromycin base Allergy  Hives Verified 05/23/18 16:44














PMH/Surg Hx/FS Hx/Imm Hx





- Additional Past Medical History


Additional PMH: 





NEGATIVE PMHx: DM, HTN


Respiratory History: Asthma


Other History Of: 


   Negative For: HIV, Hepatitis B, Hepatitis C, Anticoagulant Therapy





- Surgical History


Surgical History: Yes


Surgery Procedure, Year, and Place: removal of ovary r/t cyst age 13.  c-

section x2





- Family History


Known Family History: Positive: Cardiac Disease - pt states father has arrythmia

, Diabetes


   Negative: Hypertension, Other - negative diverticulitis. 





- Social History


Alcohol Use: None


Substance Use Type: None


Smoking Status (MU): Heavy Every Day Tobacco Smoker


Type: Cigarettes


Amount Used/How Often: 1/2 PPD


Have You Smoked in the Last Year: Yes





- Immunization History


Most Recent Influenza Vaccination: 03/2017





Review of Systems


Constitutional: Negative


Skin: Negative


Eyes: Negative


ENT: Negative


Respiratory: Cough


Cardiovascular: Negative


Gastrointestinal: Negative


Genitourinary: Negative


Motor: Negative


Neurovascular: Negative


Musculoskeletal: Negative


Neurological: Headache


Psychological: Negative


All Other Systems Reviewed And Are Negative: Yes





- Comments


Additional Review of Systems Comments: 





NEGATIVE: UE and LE weakness and nausea





Physical Exam





- Summary


Physical Exam Summary: 


General: well-appearing, no pain distress


Skin: warm, color reflects adequate perfusion, dry


Head: normal


Eyes: EOMI, PIYUSH


ENT: normal


Neck: supple, nontender


Respiratory: CTA, breath sounds present


Cardiovascular: RRR


Abdomen: soft, nontender


Bowel: present


Musculoskeletal: normal, strength/ROM intact


Neurological: sensory/motor intact, A&O x3


Psychological: affect/mood appropriate





Triage Information Reviewed: Yes


Vital Signs: 


 Initial Vital Signs











Temp  98.3 F   05/23/18 16:41


 


Pulse  72   05/23/18 16:41


 


Resp  18   05/23/18 16:41


 


BP  137/83   05/23/18 16:41


 


Pulse Ox  98   05/23/18 16:41











Vital Signs Reviewed: Yes





Headache Course/Dx





- Course


Course Of Treatment: NO NEUROLOGIC OR INFECTIOUS SX.





- Differential Dx/Diagnosis


Provider Diagnoses: HEADACHE





Discharge





- Sign-Out/Discharge


Documenting (check all that apply): Discharge/Admit/Transfer - Discharge





- Discharge Plan


Condition: Stable


Disposition: HOME


Prescriptions: 


Ondansetron ODT TAB* [Zofran 4 MG Odt TAB*] 4 mg PO Q6H PRN #10 tab.odt


 PRN Reason: Nausea


Patient Education Materials:  Acute Headache (ED)


Forms:  *Work Release


Referrals: 


Vick Gunn MD [Primary Care Provider] - 


Additional Instructions: 


FOLLOW UP WITH YOUR DOCTOR IF NOT COMPLETELY IMPROVED.


GET RECHECKED FOR ANY WORSENING OF YOUR CONDITION; PAIN, WEAKNESS, NUMBNESS, 

UNEXPLAINED VOMITING, VISION OR SPEECH DIFFICULTIES OR QUESTIONS OR CONCERNS.





- Billing Disposition and Condition


Condition: STABLE


Disposition: HOME





The documentation as recorded by the Vernon beltre Rebecca accurately 

reflects the service I personally performed and the decisions made by me, 

Monty Ramos MD.

## 2018-09-02 NOTE — UC
Respiratory Complaint HPI





- HPI Summary


HPI Summary: 





started yesterday with nasal congestion and ST. today much worse and now 

coughing.has not taken Temp or any meds





- History of Current Complaint


Stated Complaint: THROAT COMPLAINT


Time Seen by Provider: 09/02/18 08:05


Hx Obtained From: Patient


Hx Last Menstrual Period: IUD


Pregnant?: No


Onset/Duration: Gradual Onset - over 2 days


Timing: Constant


Severity Initially: Mild


Severity Currently: Moderate


Character: Cough: Productive


Aggravating Factors: Recumbent Position


Alleviating Factors: Nothing


Associated Signs And Symptoms: Positive: Nasal Congestion


Related History: Seasonal Allergies





- Allergies/Home Medications


Allergies/Adverse Reactions: 


 Allergies











Allergy/AdvReac Type Severity Reaction Status Date / Time


 


erythromycin base Allergy  Hives Verified 09/02/18 08:22














PMH/Surg Hx/FS Hx/Imm Hx


Previously Healthy: Yes


Other History Of: 


   Negative For: HIV, Hepatitis B, Hepatitis C, Anticoagulant Therapy





- Surgical History


Surgical History: Yes


Surgery Procedure, Year, and Place: removal of ovary r/t cyst age 13.  c-

section x2





- Family History


Known Family History: Positive: Cardiac Disease - pt states father has arrythmia

, Diabetes


   Negative: Hypertension, Other - negative diverticulitis. 





- Social History


Occupation: Employed Part-time


Lives: With Family


Alcohol Use: None


Substance Use Type: None


Smoking Status (MU): Heavy Every Day Tobacco Smoker


Type: Cigarettes


Amount Used/How Often: 1/2 PPD


Have You Smoked in the Last Year: Yes


Cessation Counseling: Counseled 3+Min - 10 Min





- Immunization History


Most Recent Influenza Vaccination: 03/2017





Review of Systems


Constitutional: Fatigue


Skin: Negative


ENT: Sore Throat, Nasal Discharge, Sinus Congestion


Respiratory: Cough


Cardiovascular: Negative


Gastrointestinal: Negative


Is Patient Immunocompromised?: No


All Other Systems Reviewed And Are Negative: Yes





Physical Exam


Triage Information Reviewed: Yes


Appearance: No Pain Distress, Obese


Vital Signs Reviewed: Yes


Eyes: Positive: Conjunctiva Clear


ENT: Positive: Pharyngeal erythema, Nasal congestion, Tonsillar swelling.  

Negative: Tonsillar exudate


Neck exam: Normal


Neck: Positive: No Lymphadenopathy


Respiratory Exam: Normal


Respiratory: Positive: Lungs clear


Cardiovascular Exam: Normal


Cardiovascular: Positive: RRR


Neurological Exam: Normal


Neurological: Positive: Alert


Psychological Exam: Normal


Skin Exam: Normal


Skin: Negative: rashes





Respiratory Course/Dx





- Course


Course Of Treatment: Rapid strep test negative





- Differential Dx/Diagnosis


Differential Diagnosis/HQI/PQRI: Bronchitis, Lower Resp Infection, Sinusitis


Provider Diagnoses: Upper Respiratory INfection





Discharge





- Sign-Out/Discharge


Documenting (check all that apply): Patient Departure


All imaging exams completed and their final reports reviewed: Yes





- Discharge Plan


Condition: Good


Disposition: HOME


Patient Education Materials:  Upper Respiratory Infection (ED)


Forms:  *Work Release


Referrals: 


Vick Gunn MD [Primary Care Provider] - 3 Days (if no better)


Additional Instructions: 


drink plenty of fluids and rest





use over the counter cold medications as directed for symptom relief





return if problems worsen








- Billing Disposition and Condition


Condition: GOOD


Disposition: Home